# Patient Record
Sex: MALE | Employment: OTHER | URBAN - NONMETROPOLITAN AREA
[De-identification: names, ages, dates, MRNs, and addresses within clinical notes are randomized per-mention and may not be internally consistent; named-entity substitution may affect disease eponyms.]

---

## 2017-04-10 ENCOUNTER — TELEPHONE (OUTPATIENT)
Dept: UROLOGY | Age: 82
End: 2017-04-10

## 2017-05-03 ENCOUNTER — OFFICE VISIT (OUTPATIENT)
Dept: UROLOGY | Age: 82
End: 2017-05-03
Payer: MEDICARE

## 2017-05-03 VITALS
BODY MASS INDEX: 23.19 KG/M2 | OXYGEN SATURATION: 96 % | WEIGHT: 162 LBS | TEMPERATURE: 97.1 F | HEIGHT: 70 IN | DIASTOLIC BLOOD PRESSURE: 64 MMHG | HEART RATE: 107 BPM | SYSTOLIC BLOOD PRESSURE: 120 MMHG

## 2017-05-03 DIAGNOSIS — N40.0 BPH WITHOUT URINARY OBSTRUCTION: Primary | ICD-10-CM

## 2017-05-03 DIAGNOSIS — Z85.46 H/O PROSTATE CANCER: ICD-10-CM

## 2017-05-03 LAB
BILIRUBIN, POC: NORMAL
BLOOD URINE, POC: NORMAL
CLARITY, POC: NORMAL
COLOR, POC: NORMAL
GLUCOSE URINE, POC: NORMAL
KETONES, POC: NORMAL
LEUKOCYTE EST, POC: NORMAL
NITRITE, POC: NORMAL
PH, POC: 6
PROTEIN, POC: NORMAL
SPECIFIC GRAVITY, POC: 1.01
UROBILINOGEN, POC: 0.2

## 2017-05-03 PROCEDURE — 99214 OFFICE O/P EST MOD 30 MIN: CPT | Performed by: PHYSICIAN ASSISTANT

## 2017-05-03 PROCEDURE — 1123F ACP DISCUSS/DSCN MKR DOCD: CPT | Performed by: PHYSICIAN ASSISTANT

## 2017-05-03 PROCEDURE — 81002 URINALYSIS NONAUTO W/O SCOPE: CPT | Performed by: PHYSICIAN ASSISTANT

## 2017-05-03 PROCEDURE — 4040F PNEUMOC VAC/ADMIN/RCVD: CPT | Performed by: PHYSICIAN ASSISTANT

## 2017-05-03 PROCEDURE — 96402 CHEMO HORMON ANTINEOPL SQ/IM: CPT | Performed by: PHYSICIAN ASSISTANT

## 2017-05-03 PROCEDURE — G8420 CALC BMI NORM PARAMETERS: HCPCS | Performed by: PHYSICIAN ASSISTANT

## 2017-05-03 PROCEDURE — G8427 DOCREV CUR MEDS BY ELIG CLIN: HCPCS | Performed by: PHYSICIAN ASSISTANT

## 2017-05-03 PROCEDURE — 1036F TOBACCO NON-USER: CPT | Performed by: PHYSICIAN ASSISTANT

## 2017-05-03 ASSESSMENT — ENCOUNTER SYMPTOMS
DOUBLE VISION: 0
NAUSEA: 0
BACK PAIN: 0
ORTHOPNEA: 0
VOMITING: 0
HEMOPTYSIS: 0
PHOTOPHOBIA: 0
COUGH: 0

## 2017-05-10 ENCOUNTER — TELEPHONE (OUTPATIENT)
Dept: UROLOGY | Age: 82
End: 2017-05-10

## 2017-10-06 ENCOUNTER — TELEPHONE (OUTPATIENT)
Dept: UROLOGY | Age: 82
End: 2017-10-06

## 2017-10-06 NOTE — TELEPHONE ENCOUNTER
Lilly Britton the daughter has called requesting the lab orders be faxed to [facility] @ [phone]. She is also requesting the shot appt be combined with the OV and not two separate appt dates and times. These separate trips would be very hard for this patient.   Please call Lilly Britton and advise her on the status of these request.

## 2017-10-26 ENCOUNTER — TELEPHONE (OUTPATIENT)
Dept: UROLOGY | Age: 82
End: 2017-10-26

## 2017-11-10 ENCOUNTER — OFFICE VISIT (OUTPATIENT)
Dept: UROLOGY | Age: 82
End: 2017-11-10
Payer: MEDICARE

## 2017-11-10 VITALS
WEIGHT: 169 LBS | SYSTOLIC BLOOD PRESSURE: 160 MMHG | DIASTOLIC BLOOD PRESSURE: 80 MMHG | BODY MASS INDEX: 27.16 KG/M2 | TEMPERATURE: 96.1 F | HEART RATE: 101 BPM | HEIGHT: 66 IN

## 2017-11-10 DIAGNOSIS — R97.20 ELEVATED PSA: ICD-10-CM

## 2017-11-10 DIAGNOSIS — N40.0 BPH WITHOUT URINARY OBSTRUCTION: Primary | ICD-10-CM

## 2017-11-10 DIAGNOSIS — Z85.46 H/O PROSTATE CANCER: ICD-10-CM

## 2017-11-10 LAB
APPEARANCE FLUID: NORMAL
BILIRUBIN, POC: NORMAL
BLOOD URINE, POC: NORMAL
CLARITY, POC: CLEAR
COLOR, POC: YELLOW
GLUCOSE URINE, POC: NORMAL
KETONES, POC: NORMAL
LEUKOCYTE EST, POC: NORMAL
NITRITE, POC: NORMAL
PH, POC: 7
PROTEIN, POC: NORMAL
SPECIFIC GRAVITY, POC: 1.01
UROBILINOGEN, POC: 0.2

## 2017-11-10 PROCEDURE — 96402 CHEMO HORMON ANTINEOPL SQ/IM: CPT | Performed by: PHYSICIAN ASSISTANT

## 2017-11-10 PROCEDURE — G8427 DOCREV CUR MEDS BY ELIG CLIN: HCPCS | Performed by: PHYSICIAN ASSISTANT

## 2017-11-10 PROCEDURE — 81003 URINALYSIS AUTO W/O SCOPE: CPT | Performed by: PHYSICIAN ASSISTANT

## 2017-11-10 PROCEDURE — 4040F PNEUMOC VAC/ADMIN/RCVD: CPT | Performed by: PHYSICIAN ASSISTANT

## 2017-11-10 PROCEDURE — 1123F ACP DISCUSS/DSCN MKR DOCD: CPT | Performed by: PHYSICIAN ASSISTANT

## 2017-11-10 PROCEDURE — G8419 CALC BMI OUT NRM PARAM NOF/U: HCPCS | Performed by: PHYSICIAN ASSISTANT

## 2017-11-10 PROCEDURE — G8484 FLU IMMUNIZE NO ADMIN: HCPCS | Performed by: PHYSICIAN ASSISTANT

## 2017-11-10 PROCEDURE — 99214 OFFICE O/P EST MOD 30 MIN: CPT | Performed by: PHYSICIAN ASSISTANT

## 2017-11-10 PROCEDURE — 99999 PR OFFICE/OUTPT VISIT,PROCEDURE ONLY: CPT | Performed by: PHYSICIAN ASSISTANT

## 2017-11-10 PROCEDURE — 1036F TOBACCO NON-USER: CPT | Performed by: PHYSICIAN ASSISTANT

## 2017-11-10 RX ORDER — GUAIFENESIN 600 MG/1
600 TABLET, EXTENDED RELEASE ORAL
COMMUNITY
Start: 2017-11-01 | End: 2017-11-12

## 2017-11-10 ASSESSMENT — ENCOUNTER SYMPTOMS
SHORTNESS OF BREATH: 0
WHEEZING: 0
EYE REDNESS: 0
HEARTBURN: 0
NAUSEA: 0
EYE DISCHARGE: 0

## 2017-11-10 NOTE — LETTER
[facility] UROLOGY  [address] [facility], [address] [place] [address]  Larisa Rai   PHONE [phone]        Sridhar Remy  7/3/1925         LAB   DATE:  TIME:      RADIOLOGY   *CHECK IN AT [facility] REGISTRATION  DATE:  ARRIVAL TIME:  INJECTION:  CAT SCAN:  BONE SCAN:  OTHER:         PREP PRIOR TO TEST AT [facility] RADIOLOGY    FASTING: NOTHING TO EAT OR DRINK AFTER MIDNIGHT NIGHT PRIOR TO TEST. DRINK CONTRAST AT    (2 HOURS PRIOR TO CAT SCAN).     BRING LIST OF MEDICATIONS   *FOR DIABETICS    STOP GLUCOPHAGE/METFORMIN FOR 48 HOURS AFTER SCAN.  _____________________________________________________________      APPT WITH:  DATE:  TIME:

## 2017-11-10 NOTE — PROGRESS NOTES
Other Topics Concern    None     Social History Narrative    None       Family History   Problem Relation Age of Onset    Cancer Mother      stomach    Heart Disease Neg Hx        REVIEW OF SYSTEMS:  Review of Systems   Constitutional: Negative for chills and fever. HENT: Negative for hearing loss. Eyes: Negative for discharge and redness. Respiratory: Negative for shortness of breath and wheezing. Cardiovascular: Negative for leg swelling and PND. Gastrointestinal: Negative for heartburn and nausea. Genitourinary: Negative for dysuria, flank pain, frequency, hematuria and urgency. Musculoskeletal: Negative for myalgias and neck pain. Skin: Negative for itching and rash. Neurological: Negative for seizures, loss of consciousness and headaches. Endo/Heme/Allergies: Negative for environmental allergies and polydipsia. Psychiatric/Behavioral: Negative for depression and suicidal ideas. PHYSICAL EXAM:  BP (!) 160/80 (Site: Left Arm)   Pulse 101   Temp 96.1 °F (35.6 °C) (Temporal)   Ht 5' 6\" (1.676 m)   Wt 169 lb (76.7 kg)   BMI 27.28 kg/m²   Physical Exam   Constitutional: No distress. HENT:   Mouth/Throat: No oropharyngeal exudate. Eyes: Left eye exhibits no discharge. No scleral icterus. Neck: No JVD present. No tracheal deviation present. No thyromegaly present. Cardiovascular: Exam reveals no gallop and no friction rub. Pulmonary/Chest: No stridor. He has no rales. Abdominal: He exhibits no distension and no mass. There is no rebound and no guarding. Genitourinary:         Musculoskeletal: He exhibits no edema. Neurological: No cranial nerve deficit. He exhibits normal muscle tone. Coordination normal.   Skin: He is not diaphoretic. No pallor.            DATA:  U/A:    Lab Results   Component Value Date    NITRITE neg 11/10/2017    COLORU yellow 11/10/2017    PROTEINU neg 11/10/2017    PHUR 7.0 11/10/2017    CLARITYU clear 11/10/2017    SPECGRAV 1.010